# Patient Record
Sex: FEMALE | Race: WHITE | NOT HISPANIC OR LATINO | Employment: OTHER | URBAN - METROPOLITAN AREA
[De-identification: names, ages, dates, MRNs, and addresses within clinical notes are randomized per-mention and may not be internally consistent; named-entity substitution may affect disease eponyms.]

---

## 2021-06-29 NOTE — PATIENT DISCUSSION
Patient is not bothered at this time and wants to wait. Scheduled patient for Cataract Eval in a year. Told her to call the office if she notices a change in her vision.

## 2022-06-13 NOTE — PATIENT DISCUSSION
Advised patient to follow up with primary care physician for optimal control of modifiable cardiovascular risk factors.

## 2023-01-11 ENCOUNTER — NEW PATIENT (OUTPATIENT)
Dept: URBAN - METROPOLITAN AREA CLINIC 33 | Facility: CLINIC | Age: 78
End: 2023-01-11

## 2023-01-11 VITALS
DIASTOLIC BLOOD PRESSURE: 92 MMHG | SYSTOLIC BLOOD PRESSURE: 142 MMHG | HEART RATE: 75 BPM | WEIGHT: 159 LBS | HEIGHT: 61 IN | BODY MASS INDEX: 30.02 KG/M2

## 2023-01-11 DIAGNOSIS — H43.813: ICD-10-CM

## 2023-01-11 DIAGNOSIS — H04.123: ICD-10-CM

## 2023-01-11 DIAGNOSIS — H35.3132: ICD-10-CM

## 2023-01-11 PROCEDURE — 92250 FUNDUS PHOTOGRAPHY W/I&R: CPT

## 2023-01-11 PROCEDURE — 92134 CPTRZ OPH DX IMG PST SGM RTA: CPT

## 2023-01-11 PROCEDURE — 99204 OFFICE O/P NEW MOD 45 MIN: CPT

## 2023-01-11 ASSESSMENT — VISUAL ACUITY
OS_SC: 20/25+2
OD_SC: 20/30+2

## 2023-01-11 ASSESSMENT — TONOMETRY
OS_IOP_MMHG: 15
OD_IOP_MMHG: 16

## 2023-01-31 ENCOUNTER — FOLLOW UP (OUTPATIENT)
Dept: URBAN - METROPOLITAN AREA CLINIC 33 | Facility: CLINIC | Age: 78
End: 2023-01-31

## 2023-01-31 DIAGNOSIS — H43.813: ICD-10-CM

## 2023-01-31 DIAGNOSIS — H35.3132: ICD-10-CM

## 2023-01-31 DIAGNOSIS — H04.123: ICD-10-CM

## 2023-01-31 PROCEDURE — 92012 INTRM OPH EXAM EST PATIENT: CPT

## 2023-01-31 ASSESSMENT — TONOMETRY
OD_IOP_MMHG: 13
OD_IOP_MMHG: 8
OS_IOP_MMHG: 13

## 2023-01-31 ASSESSMENT — VISUAL ACUITY
OD_SC: 20/30-2
OS_SC: 20/25

## 2023-04-05 ENCOUNTER — FOLLOW UP (OUTPATIENT)
Dept: URBAN - METROPOLITAN AREA CLINIC 33 | Facility: CLINIC | Age: 78
End: 2023-04-05

## 2023-04-05 DIAGNOSIS — H04.123: ICD-10-CM

## 2023-04-05 DIAGNOSIS — H43.813: ICD-10-CM

## 2023-04-05 DIAGNOSIS — H35.3132: ICD-10-CM

## 2023-04-05 PROCEDURE — 92014 COMPRE OPH EXAM EST PT 1/>: CPT

## 2023-04-05 PROCEDURE — 92134 CPTRZ OPH DX IMG PST SGM RTA: CPT

## 2023-04-05 ASSESSMENT — TONOMETRY
OD_IOP_MMHG: 12
OS_IOP_MMHG: 12

## 2023-04-05 ASSESSMENT — VISUAL ACUITY
OD_SC: 20/25-1
OS_SC: 20/20-2

## 2024-01-10 ENCOUNTER — FOLLOW UP (OUTPATIENT)
Dept: URBAN - METROPOLITAN AREA CLINIC 33 | Facility: CLINIC | Age: 79
End: 2024-01-10

## 2024-01-10 DIAGNOSIS — H35.3132: ICD-10-CM

## 2024-01-10 DIAGNOSIS — H43.813: ICD-10-CM

## 2024-01-10 DIAGNOSIS — H04.123: ICD-10-CM

## 2024-01-10 DIAGNOSIS — H35.3113: ICD-10-CM

## 2024-01-10 PROCEDURE — 92134 CPTRZ OPH DX IMG PST SGM RTA: CPT

## 2024-01-10 PROCEDURE — 92250 FUNDUS PHOTOGRAPHY W/I&R: CPT

## 2024-01-10 PROCEDURE — 67028 INJECTION EYE DRUG: CPT

## 2024-01-10 PROCEDURE — 92014 COMPRE OPH EXAM EST PT 1/>: CPT

## 2024-01-10 PROCEDURE — J3490IZ IZERVAY 2MG

## 2024-01-10 ASSESSMENT — TONOMETRY
OD_IOP_MMHG: 14
OS_IOP_MMHG: 13

## 2024-01-10 ASSESSMENT — VISUAL ACUITY
OD_SC: 20/30-2
OS_SC: 20/30-2

## 2024-02-09 ENCOUNTER — CLINIC PROCEDURE ONLY (OUTPATIENT)
Dept: URBAN - METROPOLITAN AREA CLINIC 33 | Facility: CLINIC | Age: 79
End: 2024-02-09

## 2024-02-09 DIAGNOSIS — H35.3122: ICD-10-CM

## 2024-02-09 DIAGNOSIS — H35.3113: ICD-10-CM

## 2024-02-09 PROCEDURE — J3490IZ IZERVAY 2MG

## 2024-02-09 PROCEDURE — 92134 CPTRZ OPH DX IMG PST SGM RTA: CPT

## 2024-02-09 PROCEDURE — 67028 INJECTION EYE DRUG: CPT

## 2024-03-08 ENCOUNTER — CLINIC PROCEDURE ONLY (OUTPATIENT)
Dept: URBAN - METROPOLITAN AREA CLINIC 33 | Facility: CLINIC | Age: 79
End: 2024-03-08

## 2024-03-08 DIAGNOSIS — H35.3113: ICD-10-CM

## 2024-03-08 DIAGNOSIS — H35.3122: ICD-10-CM

## 2024-03-08 PROCEDURE — J3490IZ IZERVAY 2MG

## 2024-03-08 PROCEDURE — 92134 CPTRZ OPH DX IMG PST SGM RTA: CPT

## 2024-03-08 PROCEDURE — 67028 INJECTION EYE DRUG: CPT

## 2024-04-05 ENCOUNTER — CLINIC PROCEDURE ONLY (OUTPATIENT)
Dept: URBAN - METROPOLITAN AREA CLINIC 33 | Facility: CLINIC | Age: 79
End: 2024-04-05

## 2024-04-05 DIAGNOSIS — H35.3122: ICD-10-CM

## 2024-04-05 DIAGNOSIS — H35.3113: ICD-10-CM

## 2024-04-05 PROCEDURE — 67028 INJECTION EYE DRUG: CPT

## 2024-04-05 PROCEDURE — 92134 CPTRZ OPH DX IMG PST SGM RTA: CPT
